# Patient Record
Sex: FEMALE | Race: WHITE | Employment: UNEMPLOYED | URBAN - METROPOLITAN AREA
[De-identification: names, ages, dates, MRNs, and addresses within clinical notes are randomized per-mention and may not be internally consistent; named-entity substitution may affect disease eponyms.]

---

## 2017-01-13 ENCOUNTER — TELEPHONE (OUTPATIENT)
Dept: INTERNAL MEDICINE CLINIC | Facility: CLINIC | Age: 57
End: 2017-01-13

## 2017-01-14 NOTE — TELEPHONE ENCOUNTER
SPOKE OT PT SHE IN [facility] WILL BE SEEING NEW INTERNIST IN 2 WEEKS , WILL BE SEEING  ONCOLOGITS AT [facility]  About  Carcinoid tumot in 1 week , T  Slight  Improvement, will sign off on  PT ORDERS

## 2017-02-05 ENCOUNTER — PATIENT MESSAGE (OUTPATIENT)
Dept: INTERNAL MEDICINE CLINIC | Facility: CLINIC | Age: 57
End: 2017-02-05

## 2017-02-07 NOTE — TELEPHONE ENCOUNTER
From: Lashanda Chavarria  To: Cecilio Shah MD  Sent: 2/5/2017 12:14 PM CST  Subject: Prescription Question    Hi Dr Minor Perrin, I wanted to give you an update on my health.  I have chosen an internist here in [facility] but I have not signed a release of felisa

## 2017-02-10 ENCOUNTER — TELEPHONE (OUTPATIENT)
Dept: INTERNAL MEDICINE CLINIC | Facility: CLINIC | Age: 57
End: 2017-02-10

## 2017-02-10 RX ORDER — ALPRAZOLAM 0.5 MG/1
TABLET ORAL
Qty: 90 TABLET | Refills: 1 | OUTPATIENT
Start: 2017-02-10

## 2017-02-10 NOTE — TELEPHONE ENCOUNTER
Spoke to patient, she is getting established with new team of physicians, you internal medicine  states that does not want to prescribe Xanax, she does not need pain medication anymore she was seen at emergency room because pain was so bad and she the a

## 2017-05-08 ENCOUNTER — APPOINTMENT (OUTPATIENT)
Dept: HEMATOLOGY/ONCOLOGY | Facility: HOSPITAL | Age: 57
End: 2017-05-08
Attending: INTERNAL MEDICINE
Payer: MEDICARE

## 2017-05-30 RX ORDER — LISINOPRIL 10 MG/1
TABLET ORAL
Qty: 90 TABLET | Refills: 2 | OUTPATIENT
Start: 2017-05-30